# Patient Record
Sex: MALE | Race: WHITE | Employment: FULL TIME | URBAN - NONMETROPOLITAN AREA
[De-identification: names, ages, dates, MRNs, and addresses within clinical notes are randomized per-mention and may not be internally consistent; named-entity substitution may affect disease eponyms.]

---

## 2019-12-06 ENCOUNTER — HOSPITAL ENCOUNTER (OUTPATIENT)
Dept: GENERAL RADIOLOGY | Age: 59
Discharge: HOME | End: 2019-12-08
Payer: COMMERCIAL

## 2019-12-06 DIAGNOSIS — M25.552 LEFT HIP PAIN: ICD-10-CM

## 2019-12-06 DIAGNOSIS — M25.551 RIGHT HIP PAIN: ICD-10-CM

## 2019-12-06 DIAGNOSIS — M54.50 LOW BACK PAIN, UNSPECIFIED BACK PAIN LATERALITY, UNSPECIFIED CHRONICITY, UNSPECIFIED WHETHER SCIATICA PRESENT: ICD-10-CM

## 2019-12-06 PROCEDURE — 73502 X-RAY EXAM HIP UNI 2-3 VIEWS: CPT

## 2019-12-06 PROCEDURE — 72110 X-RAY EXAM L-2 SPINE 4/>VWS: CPT

## 2020-04-27 ENCOUNTER — HOSPITAL ENCOUNTER (OUTPATIENT)
Dept: VASCULAR LAB | Age: 60
Discharge: HOME | End: 2020-04-29
Payer: COMMERCIAL

## 2020-04-27 PROCEDURE — 93923 UPR/LXTR ART STDY 3+ LVLS: CPT

## 2025-05-14 ENCOUNTER — OFFICE VISIT (OUTPATIENT)
Dept: VASCULAR SURGERY | Age: 65
End: 2025-05-14
Payer: COMMERCIAL

## 2025-05-14 VITALS
TEMPERATURE: 97.1 F | SYSTOLIC BLOOD PRESSURE: 169 MMHG | WEIGHT: 182.9 LBS | HEART RATE: 80 BPM | DIASTOLIC BLOOD PRESSURE: 89 MMHG | HEIGHT: 72 IN | BODY MASS INDEX: 24.77 KG/M2 | RESPIRATION RATE: 16 BRPM

## 2025-05-14 DIAGNOSIS — I77.6 VASCULITIS: Primary | ICD-10-CM

## 2025-05-14 PROCEDURE — 99205 OFFICE O/P NEW HI 60 MIN: CPT | Performed by: INTERNAL MEDICINE

## 2025-05-14 PROCEDURE — 1123F ACP DISCUSS/DSCN MKR DOCD: CPT | Performed by: INTERNAL MEDICINE

## 2025-05-14 RX ORDER — ASPIRIN 81 MG/1
81 TABLET ORAL DAILY
COMMUNITY

## 2025-05-14 RX ORDER — AMLODIPINE AND BENAZEPRIL HYDROCHLORIDE 5; 10 MG/1; MG/1
1 CAPSULE ORAL DAILY
COMMUNITY
Start: 2025-05-02

## 2025-05-14 NOTE — PROGRESS NOTES
Eduardo Jang was seen on 5/14/2025 for   Chief Complaint   Patient presents with    Foot Problem     Patient reports bilateral feet feeling like \"walking on stones\" and intermittent discoloration/cold sensation for approx. 1 year ago. Completed venous duplex and TRU study.   .  5/14/25 1st [facility] vascular visit. Referred by Julio César Shook NP. 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 Feels like he's walking on stones. Mostly toes.  No calf sx with activity. Can cramp at night in calves or thighs. No arm sx except for arthritis. Also has knee arthritis. BVH 5/2/25 DUS no clot, but appearance was consistent with bilateral Baker's cysts. TRU 1.13 Bilat, L TBI 0.5, R 0.46 (Normal study also by [facility] 2020)  Smokes cigars, not cigarettes. No FH of vascular disease other than Raynaud's in Dad and younger brother.   His general health has been good.   Toes are better in warm weather. Better with good socks and footwear in winter.  No color changes of Raynaud's.     Social History     Socioeconomic History    Marital status:      Spouse name: Not on file    Number of children: Not on file    Years of education: Not on file    Highest education level: Not on file   Occupational History    Not on file   Tobacco Use    Smoking status: Every Day     Types: Cigars    Smokeless tobacco: Never   Vaping Use    Vaping status: Never Used   Substance and Sexual Activity    Alcohol use: Not Currently    Drug use: No    Sexual activity: Not on file   Other Topics Concern    Not on file   Social History Narrative    Not on file     Social Drivers of Health     Financial Resource Strain: Not on file   Food Insecurity: Not on file   Transportation Needs: Not on file   Physical Activity: Not on file   Stress: Not on file   Social Connections: Not on file   Intimate Partner Violence: Not on file   Housing Stability: Not on file     Family History   Problem Relation Age of Onset    High Blood Pressure Mother

## 2025-05-14 NOTE — PROGRESS NOTES
Eduardo Jang was seen on 5/14/2025 for   Chief Complaint   Patient presents with    Foot Problem     Patient reports bilateral feet feeling like \"walking on stones\" and intermittent discoloration/cold sensation for approx. 1 year ago. Completed venous duplex and TRU study.                               REVIEW OF SYSTEMS    Constitutional Weight: absent, A, Fatigue: absent Fever: absent   HEENT Ears: normal,Visual disturbance: absent Sore throat: absent,    Respiratory Shortness of breath: absent, Cough: absent;, Snoring: absent   Cardivascular Chest pain: absent,  Leg pain: present,Leg swelling:absent, Non-healing wound:absent    GI Diarrhea: absent, Abdominal Pain: absent    Urinary frequency: absent, Urinary incontinence: absent   Musculoskeletal Neck pain: absent, Back pain: absent, Restless Leg:absent     Dermatological Hair loss: absent, Skin changes: present   Neurological  Sudden Loss of Vision in one eye:absent, Slurred Speech:absent, Weakness on one side of body: absent,Headache: absent   Psychiatric Anxiety: absent, Depression: absent   Hematologic Abnormal bleeding: absent,

## 2025-05-14 NOTE — PATIENT INSTRUCTIONS
SURVEY:    Thank you for allowing us to care for you today.    You may be receiving a survey from [facility] regarding your visit today- electronically or via mail.      Please help us by completing the survey as this will provide the needed feedback to ensure we are providing the very best care for you and your family.    If you cannot score us a very good on any question, please call the office to discuss how we could have made your experience a very good one.    Thank you.       STAFF:    France Lazcano, Clarissa GILLESPIE      CLINICAL STAFF:    Digna SAMANIEGO, Dorota GILLESPIE, Ceci GILLESPIE, Bee SAMANIEGO

## 2025-05-20 DIAGNOSIS — I77.6 VASCULITIS: ICD-10-CM

## 2025-05-28 ENCOUNTER — OFFICE VISIT (OUTPATIENT)
Dept: VASCULAR SURGERY | Age: 65
End: 2025-05-28
Payer: COMMERCIAL

## 2025-05-28 VITALS
SYSTOLIC BLOOD PRESSURE: 168 MMHG | RESPIRATION RATE: 22 BRPM | HEART RATE: 83 BPM | HEIGHT: 72 IN | DIASTOLIC BLOOD PRESSURE: 87 MMHG | BODY MASS INDEX: 25.04 KG/M2 | WEIGHT: 184.9 LBS | TEMPERATURE: 97.1 F

## 2025-05-28 DIAGNOSIS — I73.9 PAD (PERIPHERAL ARTERY DISEASE): Primary | ICD-10-CM

## 2025-05-28 PROCEDURE — 99214 OFFICE O/P EST MOD 30 MIN: CPT | Performed by: INTERNAL MEDICINE

## 2025-05-28 PROCEDURE — 1123F ACP DISCUSS/DSCN MKR DOCD: CPT | Performed by: INTERNAL MEDICINE

## 2025-05-28 RX ORDER — CILOSTAZOL 50 MG/1
50 TABLET ORAL 2 TIMES DAILY
Qty: 180 TABLET | Refills: 3 | Status: SHIPPED | OUTPATIENT
Start: 2025-05-28 | End: 2025-08-26

## 2025-05-28 NOTE — PATIENT INSTRUCTIONS
SURVEY:    Thank you for allowing us to care for you today.    You may be receiving a survey from [facility] regarding your visit today- electronically or via mail.      Please help us by completing the survey as this will provide the needed feedback to ensure we are providing the very best care for you and your family.    If you cannot score us a very good on any question, please call the office to discuss how we could have made your experience a very good one.    Thank you.       STAFF:    France Lazcano, Clarissa GILLESPIE      CLINICAL STAFF:    Digna SAMANIEGO, Dorota GILLESPIE, Ceci GILLESPIE, Bee SAMANIEGO

## 2025-05-28 NOTE — PROGRESS NOTES
Eduardo Jang was seen on 5/28/2025 for   Chief Complaint   Patient presents with    Vasculitis     2 week follow up with lab results. Patient reports improvement since last visit.   .    5/14/25 1st [facility] vascular visit. Referred by Julio César Shook NP. 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 Feels like he's walking on stones. Mostly toes.  No calf sx with activity. Can cramp at night in calves or thighs. No arm sx except for arthritis. Also has knee arthritis. BVH 5/2/25 DUS no clot, but appearance was consistent with bilateral Baker's cysts. TRU 1.13 Bilat, L TBI 0.5, R 0.46 (Normal study also by [facility] 2020)  Smokes cigars, not cigarettes. No FH of vascular disease other than Raynaud's in Dad and younger brother.   His general health has been good.   Toes are better in warm weather. Better with good socks and footwear in winter.  No color changes of Raynaud's.   UPDATE 5/28/25 LAB shows normal crp, esr, cr, lft  Social History     Socioeconomic History    Marital status:      Spouse name: Not on file    Number of children: Not on file    Years of education: Not on file    Highest education level: Not on file   Occupational History    Not on file   Tobacco Use    Smoking status: Every Day     Types: Cigars    Smokeless tobacco: Never   Vaping Use    Vaping status: Never Used   Substance and Sexual Activity    Alcohol use: Not Currently    Drug use: No    Sexual activity: Not on file   Other Topics Concern    Not on file   Social History Narrative    Not on file     Social Drivers of Health     Financial Resource Strain: Not on file   Food Insecurity: Not on file   Transportation Needs: Not on file   Physical Activity: Not on file   Stress: Not on file   Social Connections: Not on file   Intimate Partner Violence: Not on file   Housing Stability: Not on file     Family History   Problem Relation Age of Onset    High Blood Pressure Mother     Heart Disease Mother     Other (Other)

## 2025-05-28 NOTE — PROGRESS NOTES
Eduardo Jang was seen on 5/28/2025 for   Chief Complaint   Patient presents with    Vasculitis     2 week follow up with lab results. Patient reports improvement since last visit.                               REVIEW OF SYSTEMS    Constitutional Weight: absent, A, Fatigue: absent Fever: absent   HEENT Ears: normal,Visual disturbance: absent Sore throat: absent,    Respiratory Shortness of breath: absent, Cough: absent;, Snoring: absent   Cardivascular Chest pain: absent,  Leg pain: absent,Leg swelling:absent, Non-healing wound:absent    GI Diarrhea: absent, Abdominal Pain: absent    Urinary frequency: absent, Urinary incontinence: absent   Musculoskeletal Neck pain: absent, Back pain: absent, Restless Leg:absent     Dermatological Hair loss: absent, Skin changes: absent   Neurological  Sudden Loss of Vision in one eye:absent, Slurred Speech:absent, Weakness on one side of body: absent,Headache: absent   Psychiatric Anxiety: absent, Depression: absent   Hematologic Abnormal bleeding: absent,

## 2025-07-09 ENCOUNTER — OFFICE VISIT (OUTPATIENT)
Dept: VASCULAR SURGERY | Age: 65
End: 2025-07-09
Payer: COMMERCIAL

## 2025-07-09 VITALS
TEMPERATURE: 97.8 F | HEIGHT: 72 IN | SYSTOLIC BLOOD PRESSURE: 147 MMHG | DIASTOLIC BLOOD PRESSURE: 80 MMHG | HEART RATE: 91 BPM | RESPIRATION RATE: 16 BRPM | WEIGHT: 178.9 LBS | BODY MASS INDEX: 24.23 KG/M2

## 2025-07-09 DIAGNOSIS — I73.9 PAD (PERIPHERAL ARTERY DISEASE): Primary | ICD-10-CM

## 2025-07-09 PROCEDURE — 1123F ACP DISCUSS/DSCN MKR DOCD: CPT | Performed by: INTERNAL MEDICINE

## 2025-07-09 PROCEDURE — 99213 OFFICE O/P EST LOW 20 MIN: CPT | Performed by: INTERNAL MEDICINE

## 2025-07-09 NOTE — PROGRESS NOTES
Eduardo Jang was seen on 7/9/2025 for   Chief Complaint   Patient presents with    Peripheral artery disease     6 week follow up. Patient reports sensation of \"walking on stones\" has improved since last visit.                               REVIEW OF SYSTEMS    Constitutional Weight: absent, A, Fatigue: absent Fever: absent   HEENT Ears: normal,Visual disturbance: absent Sore throat: absent,    Respiratory Shortness of breath: absent, Cough: absent;, Snoring: absent   Cardivascular Chest pain: absent,  Leg pain: present,Leg swelling:absent, Non-healing wound:absent    GI Diarrhea: absent, Abdominal Pain: absent    Urinary frequency: absent, Urinary incontinence: absent   Musculoskeletal Neck pain: absent, Back pain: absent, Restless Leg:absent     Dermatological Hair loss: absent, Skin changes: absent   Neurological  Sudden Loss of Vision in one eye:absent, Slurred Speech:absent, Weakness on one side of body: absent,Headache: present   Psychiatric Anxiety: absent, Depression: absent   Hematologic Abnormal bleeding: absent,          
Circuplex      NONFORMULARY daily Ligaplex      WHEAT GERM OIL PO Take by mouth daily       No current facility-administered medications for this visit.         Physical findings:  General- no acute distress, oriented  HEENT - no xanthelasma, external ears normal  Neck- Supple, no thyromegaly  CV- Regular rate and rythym, no murmurs rubs or gallops  Abdomen - Non tender, non distended, no bruits  Skin- warm, dry, no skin breakdown or gangrene  Extremities - no edema        Assessment:  1. PAD (peripheral artery disease)       Small vessel disease, with favorable response to cilos without sig complications  Plan of care:  RTC 6 MO  20 Min chart review and encounter  Follow up and evaluate.       Electronically signed by Michael Rocha MD on 7/9/25 at 1:36 PM EDT

## 2025-07-09 NOTE — PATIENT INSTRUCTIONS
SURVEY:    Thank you for allowing us to care for you today.    You may be receiving a survey from [facility] regarding your visit today- electronically or via mail.      Please help us by completing the survey as this will provide the needed feedback to ensure we are providing the very best care for you and your family.    If you cannot score us a very good on any question, please call the office to discuss how we could have made your experience a very good one.    Thank you.       STAFF:    France Lazcano, Clarissa GILLESPIE      CLINICAL STAFF:    Digna SAMANIEGO, Dorota GILLESPIE, Ceci GILLESPIE, Bee SAMANIEGO